# Patient Record
Sex: FEMALE | Employment: OTHER | URBAN - METROPOLITAN AREA
[De-identification: names, ages, dates, MRNs, and addresses within clinical notes are randomized per-mention and may not be internally consistent; named-entity substitution may affect disease eponyms.]

---

## 2018-06-21 ENCOUNTER — OFFICE VISIT (OUTPATIENT)
Dept: OTOLARYNGOLOGY | Facility: CLINIC | Age: 63
End: 2018-06-21
Payer: OTHER GOVERNMENT

## 2018-06-21 DIAGNOSIS — H92.01 ACUTE OTALGIA, RIGHT: Primary | ICD-10-CM

## 2018-06-21 DIAGNOSIS — H91.90 PERCEIVED HEARING LOSS: ICD-10-CM

## 2018-06-21 DIAGNOSIS — M26.621 ARTHRALGIA OF RIGHT TEMPOROMANDIBULAR JOINT: ICD-10-CM

## 2018-06-21 PROCEDURE — 99203 OFFICE O/P NEW LOW 30 MIN: CPT | Mod: S$PBB,,, | Performed by: ORTHOPAEDIC SURGERY

## 2018-06-21 NOTE — PROGRESS NOTES
Subjective:       Patient ID: Tanya Savage is a 62 y.o. female.    Chief Complaint: No chief complaint on file.    Patient is a very pleasant 62 year old female here to see me today for evaluation of a 10 day history of right otalgia.  She says that she has been on oral predisone due to temporal arteritis, and she feels that may have triggered her symptoms.  She has been on oral doxycyxline with no improvement.  She has noted muffled hearing in both ears.  She has an intermittent high pitched tinnitus in her ears.  She has had subjective swelling of her right ear and jaw.  She follows both with her PCP and a Neurologist.  She has been on an ear drop as well recently (cortisporin).  She has no known dental issues, but has not seen a dentist in some time.  She does know that she grinds her teeth during the day as well as at night, she says she has been doing more recently.  She has not had any recent audiograms.      Review of Systems   Constitutional: Negative for chills, fatigue, fever and unexpected weight change.   HENT: Positive for ear pain and hearing loss. Negative for congestion, dental problem, ear discharge, facial swelling, nosebleeds, postnasal drip, rhinorrhea, sinus pressure, sneezing, sore throat, tinnitus, trouble swallowing and voice change.    Eyes: Positive for visual disturbance. Negative for redness and itching.   Respiratory: Negative for cough, choking, shortness of breath and wheezing.    Cardiovascular: Negative for chest pain and palpitations.   Gastrointestinal: Negative for abdominal pain.        No reflux.   Musculoskeletal: Negative for gait problem.   Skin: Negative for rash.   Neurological: Negative for dizziness, light-headedness and headaches.       Objective:      Physical Exam   Constitutional: She is oriented to person, place, and time. She appears well-developed and well-nourished. No distress.   HENT:   Head: Normocephalic and atraumatic.   Right Ear: External ear and ear canal  normal. Tympanic membrane is scarred. No middle ear effusion.   Left Ear: External ear and ear canal normal. Tympanic membrane is scarred.  No middle ear effusion.   Nose: Nose normal. No mucosal edema, rhinorrhea, nasal deformity or septal deviation. No epistaxis. Right sinus exhibits no maxillary sinus tenderness and no frontal sinus tenderness. Left sinus exhibits no maxillary sinus tenderness and no frontal sinus tenderness.   Mouth/Throat: Uvula is midline, oropharynx is clear and moist and mucous membranes are normal. Mucous membranes are not pale and not dry. No dental caries. No oropharyngeal exudate or posterior oropharyngeal erythema.   Pain anterior to right ear, worse with jaw movement   Eyes: Conjunctivae, EOM and lids are normal. Pupils are equal, round, and reactive to light. Right eye exhibits no chemosis. Left eye exhibits no chemosis. Right conjunctiva is not injected. Left conjunctiva is not injected. No scleral icterus. Right eye exhibits normal extraocular motion and no nystagmus. Left eye exhibits normal extraocular motion and no nystagmus.   Neck: Trachea normal and phonation normal. No tracheal tenderness present. No tracheal deviation present. No thyroid mass and no thyromegaly present.   Cardiovascular: Intact distal pulses.    Pulmonary/Chest: Effort normal. No stridor. No respiratory distress.   Abdominal: She exhibits no distension.   Lymphadenopathy:        Head (right side): No submental, no submandibular, no preauricular, no posterior auricular and no occipital adenopathy present.        Head (left side): No submental, no submandibular, no preauricular, no posterior auricular and no occipital adenopathy present.     She has no cervical adenopathy.   Neurological: She is alert and oriented to person, place, and time. No cranial nerve deficit.   Skin: Skin is warm and dry. No rash noted. No erythema.   Psychiatric: She has a normal mood and affect. Her behavior is normal.        Assessment:       1. Acute otalgia, right    2. Arthralgia of right temporomandibular joint    3. Perceived hearing loss        Plan:       1.  Right acute otalgia:  Ear exam today much improved, no longer has any fluid or infection as the cause for her ear pain.  Exam consistent with TMJ.  She has had increased stress and anxiety related to her diagnosis of temporal arteritis and has had stressors with her family, all likely contributing to increased symptoms.  2.  TMJ:  We had a long discussion regarding the underlying pathology of temporomandibular joint dysfunction (TMD) as the cause of ear pain.  We further discussed conservative measures to treat TMD including avoiding gum and other foods that require lots of chewing, warm compresses, and scheduled antinflammatories.  If the pain persists, the patient will then schedule an appointment with a dentist for further evaluation.  3.  Hearing loss:  I would recommend an audiogram.  She will call to schedule at the same time as her next appointment in .

## 2018-06-21 NOTE — LETTER
June 21, 2018      Theo Park MD  [address]  [address]  [place] [address]           [place] [facility]  [address]  [place] [address]  Phone: [phone]          Patient: Tanya Savage   MR Number: [id number]   YOB: 1955   Date of Visit: 6/21/2018       Dear Dr. Theo Park:    Thank you for referring Tanya Savage to me for evaluation. Attached you will find relevant portions of my assessment and plan of care.    If you have questions, please do not hesitate to call me. I look forward to following Tanya Savage along with you.    Sincerely,    Kacie Murrell MD    Enclosure  CC:  No Recipients    If you would like to receive this communication electronically, please contact [email or web address] or [phone] to request more information on [facility] access.    For providers and/or their staff who would like to refer a patient to [facility], please contact us through our one-stop-shop provider referral line, [facility], at [phone].    If you feel you have received this communication in error or would no longer like to receive these types of communications, please e-mail [email or web address]

## 2018-06-21 NOTE — LETTER
June 21, 2018        Nicol Begum MD  [address]  [place] [address]             [place] [facility]  [address]  [place] [address]  Phone: [phone]   Patient: Tanya Savage   MR Number: [id number]   YOB: 1955   Date of Visit: 6/21/2018       Dear Dr. Begum:    Thank you for referring Tanya Savage to me for evaluation. Attached you will find relevant portions of my assessment and plan of care.    If you have questions, please do not hesitate to call me. I look forward to following Tanya Savage along with you.    Sincerely,      Kacie Murrell MD          CC  No Recipients    Enclosure